# Patient Record
(demographics unavailable — no encounter records)

---

## 2024-10-08 NOTE — REASON FOR VISIT
[Follow-Up] : a follow-up visit [TextBox_44] :  abnormal CT ? Sarcoidosis, SOB, former 30  + pack year smoker, COPD at risk? seasonal /environmental allergies, poor sleep? ONIEL

## 2024-10-08 NOTE — ADDENDUM
[FreeTextEntry1] : Documented by Ant Marques acting as a scribe for Dr. Benjamin Martin on 10/08/2024. All medical record entries made by the Scribe were at my, Dr. Benjamin Martin's, direction and personally dictated by me on 10/08/2024. I have reviewed the chart and agree that the record accurately reflects my personal performance of the history, physical exam, assessment and plan. I have also personally directed, reviewed, and agree with the discharge instructions.

## 2024-10-08 NOTE — HISTORY OF PRESENT ILLNESS
[TextBox_4] :  Mr. MORALES is a 59 year male  former 33 pack year smoker (25-58) with a history of  HLD, Psoriasis, syncope related to bradycardia, heart s/p PPM (1/2024), abnormal coronary CT, anxiety who now comes in for an follow up pulmonary evaluation. His chief complaint is  - he notes he is feeling better from last visit -he denies exercising - he notes lightheadedness - he notes lightheadedness with exertion  - he notes SOB - he notes his SOB is worsened with exertion (dancing) - he notes some GERD/reflux - he notes good quality of sleep - he notes his energy level is a 7/10 - he notes diet is mostly good - he notes he went to Ophthalmologist for testing of his eyes, tests came back normal  -he denies any headaches, nausea, emesis, fever, chills, sweats, chest pain, chest pressure, coughing, wheezing, palpitations, diarrhea, constipation, dysphagia, vertigo, arthralgias, myalgias, leg swelling, itchy eyes, itchy ears, or sour taste in the mouth.

## 2024-10-08 NOTE — PHYSICAL EXAM
[No Acute Distress] : no acute distress [Normal Oropharynx] : normal oropharynx [III] : Mallampati Class: III [Normal Appearance] : normal appearance [No Neck Mass] : no neck mass [Normal Rate/Rhythm] : normal rate/rhythm [Normal S1, S2] : normal s1, s2 [No Murmurs] : no murmurs [No Resp Distress] : no resp distress [Clear to Auscultation Bilaterally] : clear to auscultation bilaterally [No Abnormalities] : no abnormalities [Benign] : benign [Normal Gait] : normal gait [No Clubbing] : no clubbing [No Cyanosis] : no cyanosis [No Edema] : no edema [FROM] : FROM [Normal Color/ Pigmentation] : normal color/ pigmentation [No Focal Deficits] : no focal deficits [Oriented x3] : oriented x3 [Normal Affect] : normal affect [TextBox_54] : pacemaker [TextBox_68] :  I:E 1:3; Clear

## 2024-10-08 NOTE — PROCEDURE
[FreeTextEntry1] : Full PFT reveals normal flows; FEV1 was 3.25 L which is 99% of predicted; normal lung volumes; normal diffusion at 5.55, which is 88% of predicted; faint inspiratory limb. PFTs were performed to evaluate for SOB   FENO was 14; a normal value being less than 25 Fractional exhaled nitric oxide (FENO) is regarded as a simple, noninvasive method for assessing eosinophilic airway inflammation. Produced by a variety of cells within the lung, nitric oxide (NO) concentrations are generally low in healthy individuals. However, high concentrations of NO appear to be involved in nonspecific host defense mechanisms and chronic inflammatory diseases such as asthma. The American Thoracic Society (ATS) therefore has recommended using FENO to aid in the diagnosis and monitoring of eosinophilic airway inflammation and asthma, and for identifying steroid responsive individuals whose chronic respiratory symptoms may be caused by airway inflammation.   The American Thoracic Society (ATS) strongly recommends the use of FeNO measurement to aid in the assessment, management, and long-term monitoring of asthma. In their 2011 clinical practice guideline, the ATS emphasizes the importance of using FeNO.

## 2025-02-03 NOTE — REVIEW OF SYSTEMS
[Negative] : Endocrine [Cough] : cough [Chest Tightness] : chest tightness [SOB on Exertion] : sob on exertion [GERD] : gerd [Dizziness] : dizziness

## 2025-02-03 NOTE — HISTORY OF PRESENT ILLNESS
[TextBox_4] : Mr. MORALES is a 59-year male former 33 pack year smoker (25-58) with a history of HLD, Psoriasis, syncope related to bradycardia, heart s/p PPM (1/2024), abnormal coronary CT, anxiety who now comes in for a follow up pulmonary evaluation. His chief complaint is  -reports he had a bronchoscopy a few months ago -reports he has lightheadedness -reports SOB and dizziness -reports wheezing -reports he has anxiety follows up with a therapist and psychiatrist -reports he was recently fired from his job -reports he is trying to apply for disability  -he denies any headaches, nausea, emesis, fever, chills, sweats, chest pain, chest pressure, coughing, wheezing, palpitations, diarrhea, constipation, dysphagia, vertigo, arthralgias, myalgias, leg swelling, itchy eyes, itchy ears, or sour taste in the mouth.

## 2025-02-03 NOTE — ASSESSMENT
[FreeTextEntry1] : Mr. MORALES is a 59-year-old male   former 33 pack year smoker (25-58) with a history of  HLD, Psoriasis, syncope related to bradycardia, heart s/p PPM (1/2024), abnormal coronary CT, anxiety who now comes to the office for an follow up pulmonary evaluation for abnormal CT (+) Sarcoidosis (former 30+ pack year smoker), COPD at risk? seasonal /environmental allergies, poor sleep? ONIEL -- improved    The patient's SOB is felt to be multifactorial: -Pulmonary     - (+) sarcoidosis     -COPD at risk (former smoking hx)  -Cardiac (G[facility])     -underlying electrical issues     -PPM     -(+) cardiac sarcoidosis   Problem 1: (+) Sarcoidosis-"asymptomatic" C/w Bx -recommend PFT's regularly -s/p ACE levels WNL -s/p hypersensitivity panel WNL  -s/p QuantiFERON-TB Gold -s/p Bronchoscopy to evaluate lymph nodes in chest area (EBUS)-Georgina cuellar al -- confirmed -sarcoidosis  confirmed, PFT's x2 year and ophthalmological evaluate for optical sarcoidosis    Problem 1B: COPD / Asthma (30+ year smoker) - continue Ventolin 2 puffs Q6H, pre-exercise -continue Trelegy 200 1 inhalation qD  -COPD is a progressive disease and although it cant be cured, appropriate management can slow its progression, reduce frequency and severity of exacerbations, and improve symptoms and the patient quality of life. Hospitalizations are the greatest contributor to the total COPD costs and account for up to 87% of total COPD related costs. Exacerbations are the main cause of admissions and subsequently account for the 40%-75% of COPD costs. Inhaled maintenance therapy reduces the incidence of exacerbations in patients with stable CPPD. Incorrect inhaler use and nonadherence are major obstacles to achieving COPD treatments goals. Many COPD patients have challenges (impaired inhalation, limited dexterity, reduced cognition: that limit their ability to correctly use their COPD treatment devices resulting in reduced symptom control. Of most importance is smoking cessation and early intervention with respiratory illnesses and contemplation for pulmonary rehab to enhance quality of life.  Problem 2: Abnormal CT -recommend f/u CT scan 3 months from now- 10/2024 (next) ordered again -s/p evaluate with Dr. Erickson CAT scans are the only radiological modality to identify abnormalities w/in the lungs with regards to nodules/masses/lymph nodes. Risks, benefits were reviewed in detail. The guidelines for abnormalities include follow up CT scans at various intervals which could range from 6 weeks to 1 year intervals. If there is a change for the worse then consideration for a biopsy will be considered if you are a candidate. Second opinion evaluation with a thoracic surgeon or an interventional radiologist could be offered.  Problem 3: Lung cancer screening (former 30 + year smoker) -recommend LDCT-yearly after formal dx (yearly CT) -Lung cancer screening is recommended for people between the ages of 55 and 80 with prior 30+ pack smoking histories. There is not been irrefutable evidence for realization of lung cancer screening based on two large randomized control trials demonstrating relative reduction in lung cancer mortality for patients undergoing low dose CT scanning. Risks and benefits reviewed with the patient.  Problem 4: Allergy /Sinus-likely -s/p Blood work to include: asthma panel, food IgE panel, IgE level, eosinophil level,vitamin D level (WNL)  -add Olopatadine 0.6% 1 sniff BID  Environmental measures for allergies were encouraged including mattress and pillow cover, air purifier, and environmental controls.   Problem 5: R/O ONIEL (elevated Mallampati class, nocturia, fatigue) -complete home sleep study - declined -Sleep apnea is associated with adverse clinical consequences which can affect most organ systems. Cardiovascular disease risk includes arrhythmias, atrial fibrillation, hypertension, coronary artery disease, and stroke. Metabolic disorders include diabetes type 2, non-alcoholic fatty liver disease. Mood disorder especially depression; and cognitive decline especially in the elderly. Associations with chronic reflux/Barretts esophagus some but not all inclusive. -Reasons include arousal consistent with hypopnea; respiratory events most prominent in REM sleep or supine position; therefore sleep staging and body position are important for accurate diagnosis and estimation of AHI.   Problem 6: Cardiac -recommend Cardiac MRI (pending results) -Recommend cardiac follow up evaluation with cardiologist if needed([facility])   Problem 7: Health Maintenance -recommended Sanotize anti viral nasal spray in case of viral infection -recommend flu vaccine - declined -recommended strep pneumonia vaccines: Prevnar-20 vaccine, follow by Pneumo vaccine 23 one year following -recommended early intervention for URIs -recommended regular osteoporosis evaluations -recommended early dermatological evaluations -recommended after the age of 50 to the age of 70, colonoscopy every 5 years  Patient is applying for disability   f/u in 6-8 months pt is encouraged to call or fax the office with any questions or concerns.

## 2025-05-09 NOTE — HISTORY OF PRESENT ILLNESS
[TextBox_4] : Mr. MORALES is a 59-year male former 33 pack year smoker (25-58) with a history of HLD, Psoriasis, syncope related to bradycardia, heart s/p PPM (1/2024), abnormal coronary CT, anxiety who now comes in for a follow up pulmonary evaluation. His chief complaint is  -reports he has been having SOB he went his cardiologist yesterday stated everything was fine -reports he's going to his PCP  today -reports he's getting lightheaded with exertion -reports he has anxiety that maybe contributing to his SOB  -reports poor sleep -reports he smokes marijuana every night to calm down and help him sleep -reports nocturia  -reports his weight and appetite are stable -reports he walks everyday -reports GERD sx are controlled with rx  -he denies any headaches, nausea, emesis, fever, chills, sweats, chest pain, chest pressure, coughing, wheezing, palpitations, diarrhea, constipation, dysphagia, vertigo, arthralgias, myalgias, leg swelling, itchy eyes, itchy ears, or sour taste in the mouth.

## 2025-05-09 NOTE — ASSESSMENT
[FreeTextEntry1] : Mr. MORALES is a 60-year-old male  former 33 pack year smoker (25-58) with a history of  HLD, Psoriasis, syncope related to bradycardia, heart s/p PPM (1/2024), abnormal coronary CT, anxiety who now comes to the office for an follow up pulmonary evaluation for abnormal CT (+) Sarcoidosis (former 30+ pack year smoker), COPD at risk? seasonal /environmental allergies, poor sleep? ONIEL -- improved    The patient's SOB is felt to be multifactorial: -Pulmonary     -COPD at risk (former smoking hx)  -Cardiac ([facility])     -underlying electrical issues     -PPM     -(+) cardiac sarcoidosis   Problem 1: (+) Sarcoidosis-"asymptomatic" C/w Bx -recommend PFT's regularly -s/p ACE levels WNL -s/p hypersensitivity panel WNL  -s/p QuantiFERON-TB Gold -s/p Bronchoscopy to evaluate lymph nodes in chest area (EBUS)-Georgina cuellar al -- confirmed -sarcoidosis  confirmed, PFT's x2 year and ophthalmological evaluate for optical sarcoidosis    Problem 1B: COPD / Asthma (30+ year smoker) - continue Ventolin 2 puffs Q6H, pre-exercise  -continue Trelegy 200 1 inhalation qD -add Airsupra use PRN  -COPD is a progressive disease and although it cant be cured, appropriate management can slow its progression, reduce frequency and severity of exacerbations, and improve symptoms and the patient quality of life. Hospitalizations are the greatest contributor to the total COPD costs and account for up to 87% of total COPD related costs. Exacerbations are the main cause of admissions and subsequently account for the 40%-75% of COPD costs. Inhaled maintenance therapy reduces the incidence of exacerbations in patients with stable CPPD. Incorrect inhaler use and nonadherence are major obstacles to achieving COPD treatments goals. Many COPD patients have challenges (impaired inhalation, limited dexterity, reduced cognition: that limit their ability to correctly use their COPD treatment devices resulting in reduced symptom control. Of most importance is smoking cessation and early intervention with respiratory illnesses and contemplation for pulmonary rehab to enhance quality of life.  Problem 2: Abnormal CT -recommend f/u CT scan 3 months from now- 10/2024 (next) ordered again -s/p evaluate with Dr. Erickson CAT scans are the only radiological modality to identify abnormalities w/in the lungs with regards to nodules/masses/lymph nodes. Risks, benefits were reviewed in detail. The guidelines for abnormalities include follow up CT scans at various intervals which could range from 6 weeks to 1 year intervals. If there is a change for the worse then consideration for a biopsy will be considered if you are a candidate. Second opinion evaluation with a thoracic surgeon or an interventional radiologist could be offered.  Problem 3: Lung cancer screening (former 30 + year smoker) -recommend LDCT-yearly after formal dx (yearly CT) -Lung cancer screening is recommended for people between the ages of 55 and 80 with prior 30+ pack smoking histories. There is not been irrefutable evidence for realization of lung cancer screening based on two large randomized control trials demonstrating relative reduction in lung cancer mortality for patients undergoing low dose CT scanning. Risks and benefits reviewed with the patient.  Problem 4: Allergy /Sinus-likely -s/p Blood work to include: asthma panel, food IgE panel, IgE level, eosinophil level,vitamin D level (WNL)  -continue Olopatadine 0.6% 1 sniff BID  Environmental measures for allergies were encouraged including mattress and pillow cover, air purifier, and environmental controls.   Problem 5: R/O ONIEL (elevated Mallampati class, nocturia, fatigue) -complete home sleep study - declined -Sleep apnea is associated with adverse clinical consequences which can affect most organ systems. Cardiovascular disease risk includes arrhythmias, atrial fibrillation, hypertension, coronary artery disease, and stroke. Metabolic disorders include diabetes type 2, non-alcoholic fatty liver disease. Mood disorder especially depression; and cognitive decline especially in the elderly. Associations with chronic reflux/Barretts esophagus some but not all inclusive. -Reasons include arousal consistent with hypopnea; respiratory events most prominent in REM sleep or supine position; therefore sleep staging and body position are important for accurate diagnosis and estimation of AHI.   Problem 6: Cardiac -recommend Cardiac MRI (pending results) -Recommend cardiac follow up evaluation with cardiologist if needed([facility])   Problem 7: Health Maintenance -recommended Sanotize anti viral nasal spray in case of viral infection -recommend flu vaccine - declined -recommended strep pneumonia vaccines: Prevnar-20 vaccine, follow by Pneumo vaccine 23 one year following -recommended early intervention for URIs -recommended regular osteoporosis evaluations -recommended early dermatological evaluations -recommended after the age of 50 to the age of 70, colonoscopy every 5 years  Patient is applying for disability   f/u in 6-8 months pt is encouraged to call or fax the office with any questions or concerns.